# Patient Record
Sex: MALE | Race: WHITE | NOT HISPANIC OR LATINO | Employment: OTHER | URBAN - METROPOLITAN AREA
[De-identification: names, ages, dates, MRNs, and addresses within clinical notes are randomized per-mention and may not be internally consistent; named-entity substitution may affect disease eponyms.]

---

## 2019-09-30 NOTE — PATIENT DISCUSSION
"""offered cataract surgery. monitor for now.  patient does not want cataract surgery at this time"""

## 2021-10-04 NOTE — PATIENT DISCUSSION
Patient qualified in 2020 for bilateral upper lid bleph, patient did not go forward with surgery secondary to COVID. Patient states bilateral upper lids have become worse and he is ready to have bleph. Re-took all photos today for insurance purposes. Will send task to [facility] to schedule.

## 2022-10-03 NOTE — PATIENT DISCUSSION
1 Week PO: Healing well. Sutures removed in-office today. Instructed patient to continue using Erythromycin ointment for 3 more nights, then discontinue. MD Nia Murdock, RN  Caller: Unspecified (Yesterday, 11:11 AM)  Monitor OK can see back as needed

## 2023-04-14 ENCOUNTER — NEW PATIENT (OUTPATIENT)
Dept: URBAN - METROPOLITAN AREA CLINIC 39 | Facility: CLINIC | Age: 58
End: 2023-04-14

## 2023-04-14 DIAGNOSIS — H04.123: ICD-10-CM

## 2023-04-14 DIAGNOSIS — H40.013: ICD-10-CM

## 2023-04-14 DIAGNOSIS — H52.7: ICD-10-CM

## 2023-04-14 PROCEDURE — 92286 ANT SGM IMG I&R SPECLR MIC: CPT

## 2023-04-14 PROCEDURE — 99499RLE REFRACTIVE CONSULT/RLE

## 2023-04-14 PROCEDURE — 92133 CPTRZD OPH DX IMG PST SGM ON: CPT

## 2023-04-14 PROCEDURE — 92250 FUNDUS PHOTOGRAPHY W/I&R: CPT

## 2023-04-14 PROCEDURE — 92025 CPTRIZED CORNEAL TOPOGRAPHY: CPT

## 2023-04-14 PROCEDURE — 92136TC INTERFEROMETRY - TECHNICAL COMPONENT

## 2023-04-14 PROCEDURE — 92134 CPTRZ OPH DX IMG PST SGM RTA: CPT

## 2023-04-14 PROCEDURE — 92015 DETERMINE REFRACTIVE STATE: CPT

## 2023-04-14 ASSESSMENT — TONOMETRY
OD_IOP_MMHG: 15
OS_IOP_MMHG: 16

## 2023-04-14 ASSESSMENT — VISUAL ACUITY
OD_CC: 20/15-1
OD_CC: J2
OS_CC: J2
OS_CC: 20/15-1
OS_SC: 20/200
OD_SC: 20/200

## 2023-05-08 ENCOUNTER — TECH ONLY (OUTPATIENT)
Dept: URBAN - METROPOLITAN AREA CLINIC 39 | Facility: CLINIC | Age: 58
End: 2023-05-08

## 2023-05-08 DIAGNOSIS — H04.123: ICD-10-CM

## 2023-05-08 DIAGNOSIS — H52.7: ICD-10-CM

## 2023-05-08 DIAGNOSIS — H40.013: ICD-10-CM

## 2023-05-08 PROCEDURE — 92083 EXTENDED VISUAL FIELD XM: CPT

## 2023-05-08 PROCEDURE — 99211T TECH SERVICE

## 2023-05-12 ENCOUNTER — FOLLOW UP (OUTPATIENT)
Dept: URBAN - METROPOLITAN AREA CLINIC 39 | Facility: CLINIC | Age: 58
End: 2023-05-12

## 2023-05-12 DIAGNOSIS — H40.013: ICD-10-CM

## 2023-05-12 PROCEDURE — 99213 OFFICE O/P EST LOW 20 MIN: CPT

## 2023-05-12 ASSESSMENT — VISUAL ACUITY
OS_CC: J1+
OS_SC: 20/200
OD_SC: 20/200
OD_SC: J1+
OS_SC: J1+
OD_CC: 20/15-1
OD_CC: J2
OS_CC: 20/15-1

## 2023-05-12 ASSESSMENT — TONOMETRY
OD_IOP_MMHG: 12
OS_IOP_MMHG: 12